# Patient Record
Sex: FEMALE | Race: WHITE
[De-identification: names, ages, dates, MRNs, and addresses within clinical notes are randomized per-mention and may not be internally consistent; named-entity substitution may affect disease eponyms.]

---

## 2017-07-12 ENCOUNTER — HOSPITAL ENCOUNTER (OUTPATIENT)
Dept: HOSPITAL 39 - GMAB | Age: 72
Discharge: HOME | End: 2017-07-12
Attending: FAMILY MEDICINE
Payer: MEDICARE

## 2017-07-12 DIAGNOSIS — I10: Primary | ICD-10-CM

## 2017-12-11 ENCOUNTER — HOSPITAL ENCOUNTER (OUTPATIENT)
Dept: HOSPITAL 39 - GMAB | Age: 72
Discharge: HOME | End: 2017-12-11
Attending: FAMILY MEDICINE
Payer: MEDICARE

## 2017-12-11 DIAGNOSIS — M62.81: ICD-10-CM

## 2017-12-11 DIAGNOSIS — M79.1: Primary | ICD-10-CM

## 2017-12-21 ENCOUNTER — HOSPITAL ENCOUNTER (OUTPATIENT)
Dept: HOSPITAL 39 - US | Age: 72
Discharge: HOME | End: 2017-12-21
Attending: SURGERY
Payer: MEDICARE

## 2017-12-21 DIAGNOSIS — R10.11: Primary | ICD-10-CM

## 2017-12-21 PROCEDURE — 77063 BREAST TOMOSYNTHESIS BI: CPT

## 2017-12-21 PROCEDURE — 76700 US EXAM ABDOM COMPLETE: CPT

## 2018-01-24 ENCOUNTER — HOSPITAL ENCOUNTER (OUTPATIENT)
Dept: HOSPITAL 39 - MRI | Age: 73
End: 2018-01-24
Payer: MEDICARE

## 2018-01-24 DIAGNOSIS — R26.9: ICD-10-CM

## 2018-01-24 DIAGNOSIS — M50.30: Primary | ICD-10-CM

## 2018-01-24 DIAGNOSIS — Z98.1: ICD-10-CM

## 2018-01-24 NOTE — MRI
EXAM DESCRIPTION: 

Brain w/o Contrast: MRI.



CLINICAL HISTORY: 

Unspecified abnormalities of gait and mobility



COMPARISON: 

MRI scan of brain 3/8/2016.



TECHNIQUE: 

Multiplanar, high-field MRI unit, multiple diffusion sequences,

multiple conventional sequences without contrast.



FINDINGS: 

Multifocal regions of bright FLAIR and T2-weighted signal in the

periventricular white matter and gray-white matter junctions of

the cerebral hemispheres. More numerous on the left than the

right and more numerous in the frontal and parietal lobes. .  3

lesions, similar signal, left basal ganglia. One lesion posterior

on the right. No hemorrhage, no cerebral edema, no mass-effect.

Normal signal in the brainstem and cerebellar hemispheres. No

hemorrhage, no cerebral edema, no mass-effect.

 

Concordance of the diffusion and non-diffusion sequences with no

diffusion restriction. Cortical sulci, ventricles, and other CSF

spaces, and the subdural spaces are normally configured for

patients age. No effacement or displacement. No midline shift. No

extra-axial hemorrhage.

 

Normal flow signal void in the major vessels of the [place]

[place], and the venous sinuses. IACs are symmetric bilaterally.

Minimal fluid signal in the inferior right mastoid air cells. No

mass effect in the bilateral cerebellopontine angles.   Fluid

signal inferior to the left semicircular canals. Also soft tissue

lateral to the canals. Pituitary gland occupies most of the

sella. Base of the cerebellar tonsils is above the foramen

magnum. Minimal mucosal periosteal thickening in the ethmoid

paranasal sinuses. Bilateral alex bullosa in the middle

turbinates. The bony calvarium is intact.



IMPRESSION: 

1. Bilateral multifocal identified bright objects in the

periventricular white matter and subcortical white matter. Stable

since the prior study. This likely related to cerebral

microvascular disease. Less likely result of white matter

demyelination, vasculitis, migraine headaches, or inflammatory

process. No mass effect hemorrhage or midline shift.

2. Stable ventricles and extra-axial spaces since the prior

study.

3. Fluid inferior to the semicircular canals and soft tissue

lateral to the canals is of uncertain significance. Correlate for

inner ear disease or inflammatory process. Not seen on the prior

study.



Electronically signed by:  Los Pino MD  1/24/2018 4:02 PM CST

Workstation: [id number]

## 2018-01-24 NOTE — MRI
EXAM DESCRIPTION: 

Cervical Spine: MRI. Noncontrast.



CLINICAL HISTORY: 

Other cervical disc degeneration, unspecified cervical region



COMPARISON: 

MRI scan cervical spine 7/2/2015. MRI scan of the brain on this

visit.



TECHNIQUE: 

Multiplanar MRI, multiple sequences, non-contrast  High-field.

Cervical spine.



FINDINGS: 

C2-3: Disc desiccation with no significant bulging. No canal or

foraminal stenosis. Facets are unremarkable.



C3-4: Mild disc desiccation. Posterior disc bulge. Mild bilateral

foraminal narrowing. Mild canal narrowing. Minimal facet

arthrosis bilaterally.



C4-5: Disc desiccation with left uncinate spur. Left disc

osteophyte complex encroaching on the left neural foramen with

stenosis and 3 mm bulge of the disc abutting the left ventral

cord. Borderline canal stenosis. Mild right neural foraminal

narrowing. Facets are unremarkable.



C5-6: Disc desiccation. Posterior midline and right paracentral

disc bulge 3 mm abutting the cord. Mild right neural foraminal

narrowing and moderate canal narrowing. Bilateral facets are

negative.



C6-7: Minimal canal narrowing. Moderate neural foraminal

narrowing with left uncinate spur. No abnormal fluid collections

around the hardware or abnormal marrow signal in the vertebral

bodies or posterior elements.



C7-T1: Normal signal in the disc and disc space. Canal and

foramina neural foramina are patent. Trace anterolisthesis..

Normal facets.



Normal signal in the T1-T2 disc with no bulging. Disc spaces

preserved. Canal and neural foramina are patent. Facets are

unremarkable.



No cord compression or cord edema. Spine is minimally lordotic..

Atlantoaxial joint is minimally hypertrophic.. Base of the

cerebellar tonsils is slightly above the foramen magnum. 

Paravertebral soft tissues are unremarkable.  Vertebral bodies

are not compressed at any level. Normal marrow signal in the

remaining vertebral bodies and the posterior elements.



IMPRESSION: 

1. C4-5 left side disc osteophyte complex encroachment on the

left neural foramen which is stenotic. Correlate for left C5

radiculopathy. Posterior disc bulge. Borderline canal stenosis.

2. Disc bulges at other levels. Uncinate spurs and/or facet

arthrosis at some levels narrowing the neural foramina.

3. Anterior fusion construct at C6-7 with no evidence of

complications. Moderate canal narrowing. No significant neural

foraminal narrowing.



Electronically signed by:  Los Pino MD  1/24/2018 4:57 PM CST

Workstation: [id number]

## 2019-07-08 ENCOUNTER — HOSPITAL ENCOUNTER (OUTPATIENT)
Dept: HOSPITAL 39 - CT | Age: 74
End: 2019-07-08
Attending: NURSE PRACTITIONER
Payer: MEDICARE

## 2019-07-08 DIAGNOSIS — K62.89: Primary | ICD-10-CM

## 2019-07-08 DIAGNOSIS — N83.202: ICD-10-CM

## 2019-07-08 NOTE — CT
EXAM DESCRIPTION: Abdomen/Pelvis w/Contrast



CLINICAL HISTORY: 73 years Female, OTHER SPECIFIED DISEASES OF

ANUS AND RECTUM



COMPARISON: Ultrasound of the abdomen dated 12/21/2017. CT

abdomen pelvis dated 6/21/2013.



TECHNIQUE: Contiguous 3 mm axial images were obtained from lung

bases to the level of proximal femora after  the administration

of intravenous and oral contrast. Sagittal and coronal

reconstructions were reviewed.



FINDINGS: 



Calcified bilateral breast implants are noted. The imaged lower

thorax appears normal. Septated cystic lesions are identified in

hepatic segments 7 and 2. The gallbladder is mildly distended

with no gross abnormality. The pancreas, spleen, bilateral

adrenal glands appear normal. No hydronephrosis or

nephrolithiasis is identified within both kidneys. Soft tissue

filling defects are identified in the left upper pole renal

calyces and renal pelvis, concerning for malignancy.



The stomach is not well-distended limiting detailed evaluation.

The small bowel loops appear normal. Majority of the colon is not

well-distended limiting detailed evaluation.



The appendix appears normal



The urinary bladder is moderately distended with no gross

abnormality. The uterus is surgically absent. The right adnexa

appears normal. 2.9 cm simple left ovarian cyst is noted.



The abdominal aorta demonstrates moderate to severe

atherosclerotic disease. The inferior vena cava is normal in size

and caliber. Few subcentimeter lymph nodes are identified in the

retroperitoneum.



The visualized bones appear osteopenic. No suspicious lesions are

identified in the visualized bones.



IMPRESSION: 

1. Soft tissue filling defects are identified in the left upper

pole renal calyces and renal pelvis, concerning for malignancy.

Urology consultation is recommended.

2. 2.9 cm simple left ovarian cyst. Ultrasound of the pelvis is

recommended in one year to document stability.





This exam was performed according to our departmental

dose-optimization program, which includes automated exposure

control, adjustment of the mA and/or kV according to patient size

and/or use of iterative reconstruction technique.



Electronically signed by:  Gaurav Michael MD  7/8/2019 4:43

PM CDT Workstation: [id number]

## 2019-08-08 ENCOUNTER — HOSPITAL ENCOUNTER (EMERGENCY)
Dept: HOSPITAL 39 - ER | Age: 74
Discharge: HOME | End: 2019-08-08
Payer: MEDICARE

## 2019-08-08 VITALS — OXYGEN SATURATION: 97 %

## 2019-08-08 VITALS — SYSTOLIC BLOOD PRESSURE: 195 MMHG | DIASTOLIC BLOOD PRESSURE: 99 MMHG

## 2019-08-08 VITALS — TEMPERATURE: 98.9 F

## 2019-08-08 DIAGNOSIS — Z79.899: ICD-10-CM

## 2019-08-08 DIAGNOSIS — N39.0: ICD-10-CM

## 2019-08-08 DIAGNOSIS — I10: ICD-10-CM

## 2019-08-08 DIAGNOSIS — Z98.1: ICD-10-CM

## 2019-08-08 DIAGNOSIS — M48.02: Primary | ICD-10-CM

## 2019-08-08 DIAGNOSIS — Z88.5: ICD-10-CM

## 2019-08-08 DIAGNOSIS — Z79.82: ICD-10-CM

## 2019-08-08 DIAGNOSIS — M79.602: ICD-10-CM

## 2019-08-08 DIAGNOSIS — M54.12: ICD-10-CM

## 2019-08-08 RX ADMIN — NITROFURANTOIN MONOHYDRATE/MACROCRYSTALLINE ONE MG: 25; 75 CAPSULE ORAL at 04:48

## 2019-08-08 RX ADMIN — HYDROCODONE BITARTRATE AND ACETAMINOPHEN ONE: 10; 325 TABLET ORAL at 04:52

## 2019-08-08 RX ADMIN — HYDROCODONE BITARTRATE AND ACETAMINOPHEN ONE EA: 7.5; 325 TABLET ORAL at 04:50

## 2019-08-08 NOTE — RAD
EXAM DESCRIPTION: Single view of the chest



CLINICAL HISTORY: limb/neck pain



COMPARISON: None.



FINDINGS: Single frontal view of the chest. 



Cardiomediastinal silhouette: Atherosclerotic calcification of

the thoracic aorta. Heart is not enlarged. 

Lungs: No consolidation, pneumothorax, or pleural effusion. 

Bones: No acute osseous abnormality. Anterior fixation of the

cervical spine visualized. Bilateral breast implants.

Upper abdomen: No abnormality identified.



IMPRESSION:



1. No acute pulmonary process identified.



Electronically signed by:  Misbah Hitchcock  8/8/2019 3:36 AM

CDT Workstation: [id number]

## 2019-08-08 NOTE — ED.PDOC
History of Present Illness





- General


Chief Complaint: Upper Extremity Injury


Stated Complaint: left shoulder pain, thinks it's a pinched nerve


Time Seen by Provider: 08/08/19 01:59


Source: patient


Exam Limitations: no limitations





- History of Present Illness


Initial Comments: 





Kristi Ott 74 y/o female came to er with sharp pain back of her neck 

radiating down to her fingers for the last 2 1/2 weeks.Denies pain on her chest 

but was told that she has pinched nerve.Had MRI C-Spine last year showed 

stenosis c4-c5 and with posterior left disc bulge. Had seen 2 neurologist Dr. Rider and Dr. Alvarado in [facility] and had nerve conduction studies and 3 office 

visits but no further treatment done.


Occurred: other - 2 1/2 weeks


Pain - Upper Extremity: moderate: Shoulder, left


Method of Injury: other - NO INJURY


Improving Factors: rest


Worsening Factors: movement


Associated Symptoms: pain


Allergies/Adverse Reactions: 


Allergies





Codeine Allergy (Verified 08/08/19 01:38)


   





Home Medications: 


Ambulatory Orders





ALPRAZolam [Xanax] 0.5 mg PO QID 08/08/19 


Aspirin [(None)] 325 mg PO QD 08/08/19 


Losartan Potassium 50 mg PO DAILY 08/08/19 


Metoprolol Succinate [Metoprolol Succinate ER] 100 mg PO BEDTIME 08/08/19 


Nitrofurantoin Monohydrate Mac [Macrobid] 100 mg PO BID 7 Days #14 capsule 

08/08/19 


Omeprazole [Prilosec Cap] 40 mg PO ACBK 08/08/19 


Tramadol HCl 50 mg PO Q4HR PRN 08/08/19 


Zolpidem Tartrate [Ambien] 10 mg PO BEDTIME 08/08/19 











Review of Systems





- Review of Systems


Constitutional: States: no symptoms reported


Respiratory: States: no symptoms reported


Cardiology: States: no symptoms reported


Gastrointestinal/Abdominal: States: no symptoms reported


Genitourinary: States: no symptoms reported


Musculoskeletal: States: see HPI


Neurological: States: see HPI, other - radicular pain


All other Systems: Reviewed and Negative, No Change from Baseline





Past Medical History (General)





- Patient Medical History


Hx Seizures: No


Hx Stroke: No


Hx Dementia: No


Hx Asthma: No


Hx of COPD: No


Hx Cardiac Disorders: No


Hx Congestive Heart Failure: No


Hx Pacemaker: No


Hx Hypertension: Yes


Hx Thyroid Disease: No


Hx Diabetes: No


Hx Gastroesophageal Reflux: No


Hx Renal Disease: No


Hx Cancer: No


Hx of HIV: No


Hx Hepatitis C: No


Hx MRSA: No


Hx Other PMH: Yes - essential tremo


Surgical History: other - cataract;breast ugmentation;carotid EA bilateral;c-

spine fusion





- Vaccination History


Hx Tetanus, Diphtheria Vaccination: No


Hx Influenza Vaccination: No


Hx Pneumococcal Vaccination: Yes





- Social History


Hx Tobacco Use: Yes


Hx Alcohol Use: Yes


Hx Depression: No


Feels Threatened In Home Enviroment: No


Hx Physical Abuse: No


Hx Emotional Abuse: No





- Activities of Daily Living


[facility]


Grooming Ability: Independent


Eating (Feeding) Ability: Independent


Toileting Ability: Independent





Family Medical History





- Family History


  ** Mother


Family History: Unknown


Hx Family Asthma: Yes - copd-mom





Physical Exam





- Physical Exam


General Appearance: Alert, Comfortable, No apparent distress


Eyes, Ears, Nose, Throat Exam: normal ENT inspection, pharynx normal


Neck: supple, normal inspection, limited range of motion - pain, other - shaking

 neck


Cardiovascular/Respiratory: regular rate, rhythm, no M/R/G, normal peripheral 

pulses, no JVD, normal breath sounds


Abdominal Exam: non-tender, no organomegaly


Back Exam: no CVA tenderness, no vertebral tenderness


Elbow/Forearm Exam: normal inspection, non-tender, no evidence of injury, normal

 ROM


Wrist Exam: normal inspection, non-tender, no evidence of injury, normal ROM


Hand Exam: normal inspection, non-tender, no evidence of injury, normal ROM


DTR: 2+: Brachioradialis, left, Brachioradialis, right


Neuro/Tendon: normal sensation, normal motor functions, normal tendon functions


Mental Status: alert, oriented x 3


Skin Exam: normal color, warm/dry





Progress





- Progress


Progress: 





08/08/19 02:40


                               Vital Signs - 24 hr











  08/08/19 08/08/19





  01:27 02:38


 


Temperature 98.9 F 


 


Pulse Rate [ 79 72





monitor]  


 


Respiratory 16 18





Rate  


 


Blood Pressure 158/84 161/91





[Right Arm]  


 


O2 Sat by Pulse 98 97





Oximetry  














- Results/Orders


Results/Orders: 





                                        





08/08/19 02:00


IV Care:Saline Lock per Protoc QSHIFT 


EKG Assessment DAILY 


EKG STAT 


Chest,1 View [RAD] Stat 





08/08/19 02:05


Urine Culture Stat 








                         Laboratory Results - last 24 hr











  08/08/19 08/08/19





  02:05 02:20


 


WBC   9.9


 


RBC   4.15 L


 


Hgb   13.5


 


Hct   38.8


 


MCV   93.6


 


MCH   32.7 H


 


MCHC   34.9


 


RDW   14.1


 


Plt Count   461 H


 


MPV   6.6 L


 


Absolute Neuts (auto)   4.80


 


Absolute Lymphs (auto)   3.60 H


 


Absolute Monos (auto)   1.10 H


 


Absolute Eos (auto)   0.40


 


Absolute Basos (auto)   0.10


 


Neutrophils %   48.3


 


Lymphocytes %   35.9


 


Monocytes %   10.8 H


 


Eosinophils %   3.6


 


Basophils %   1.4


 


PT   10.4


 


INR   1.04


 


PTT (SP)   25.4


 


Sodium   131 L


 


Potassium   3.9


 


Chloride   94 L


 


Carbon Dioxide   25


 


Anion Gap   15.9


 


BUN   13


 


Creatinine   1.26


 


BUN/Creatinine Ratio   10.3


 


Random Glucose   97


 


Serum Osmolality   262.7 L


 


Calcium   9.8


 


Magnesium   1.8


 


Total Bilirubin   0.5


 


Direct Bilirubin   < 0.1


 


Indirect Bilirubin   0.4


 


AST   18


 


ALT   13


 


Alkaline Phosphatase   41 L


 


Creatine Kinase   115


 


CK-MB (CK-2)   6.6 H*


 


CK-MB (CK-2) %   Not Reportable


 


Troponin I   < 0.02


 


Serum Total Protein   7.5


 


Albumin   4.3


 


Urine Color  Yellow 


 


Urine Appearance  Cloudy 


 


Urine pH  5.5 


 


Ur Specific Gravity  1.020 


 


Urine Protein  Negative 


 


Urine Glucose (UA)  Negative 


 


Urine Ketones  Negative 


 


Urine Blood  Moderate H 


 


Urine Nitrite  Negative 


 


Urine Bilirubin  Negative 


 


Urine Urobilinogen  0.2 


 


Ur Leukocyte Esterase  Trace H 


 


Urine RBC  5-10 H 


 


Urine WBC  10-20 H 


 


Ur Epithelial Cells  10-20 


 


Urine Bacteria  3+ H 


 


Urine Yeast  1+ budding H 








Discuss all test result with patient





- EKG/XRAY/CT


EKG: Sinus, no ST T wave changes


Comments: HR-73





Departure





- Departure


Clinical Impression: 


 Cervical radiculopathy due to degenerative joint disease of spine, Spinal 

stenosis in cervical region





Limb pain


Qualifiers:


 Extremity pain location: upper extremity Laterality: left Qualified Code(s): M

79.602 - Pain in left arm





Urinary tract infection


Qualifiers:


 Urinary tract infection type: site unspecified Hematuria presence: with 

hematuria Qualified Code(s): N39.0 - Urinary tract infection, site not 

specified; R31.9 - Hematuria, unspecified





Time of Disposition: 04:47


Disposition: Discharge to Home or Self Care


Departure Forms:  ED Discharge - Pt. Copy, Patient Portal Self Enrollment


Instructions:  Spinal Stenosis, Spinal Stenosis (DC), Spinal Stenosis Stretching

 Exercises, Radiculopathy, Radiculopathy (DC)


Referrals: 


CAROL CLARK IV, NP [Primary Care Provider] - 1-2 Weeks


Prescriptions: 


Nitrofurantoin Monohydrate Mac [Macrobid] 100 mg PO BID 7 Days #14 capsule


Home Medications: 


Ambulatory Orders





ALPRAZolam [Xanax] 0.5 mg PO QID 08/08/19 


Aspirin [(None)] 325 mg PO QD 08/08/19 


Losartan Potassium 50 mg PO DAILY 08/08/19 


Metoprolol Succinate [Metoprolol Succinate ER] 100 mg PO BEDTIME 08/08/19 


Nitrofurantoin Monohydrate Mac [Macrobid] 100 mg PO BID 7 Days #14 capsule 

08/08/19 


Omeprazole [Prilosec Cap] 40 mg PO ACBK 08/08/19 


Tramadol HCl 50 mg PO Q4HR PRN 08/08/19 


Zolpidem Tartrate [Ambien] 10 mg PO BEDTIME 08/08/19 








Additional Instructions: 


Follow up with primary Md Dr. LISETH Clark 09 August 2019 for neurologist,spine 

specialist referral;Return to emergency room as needed

## 2019-08-16 ENCOUNTER — HOSPITAL ENCOUNTER (OUTPATIENT)
Dept: HOSPITAL 39 - MRI | Age: 74
End: 2019-08-16
Attending: NURSE PRACTITIONER
Payer: MEDICARE

## 2019-08-16 DIAGNOSIS — M50.10: Primary | ICD-10-CM

## 2019-08-16 DIAGNOSIS — Z98.1: ICD-10-CM

## 2019-08-16 DIAGNOSIS — M48.02: ICD-10-CM

## 2019-08-16 DIAGNOSIS — R22.1: ICD-10-CM

## 2019-08-16 NOTE — MRI
EXAM DESCRIPTION: 

Cervical Spine: MRI.



CLINICAL HISTORY: 

73 years Female RADICULOPATHY



COMPARISON: 

MRI scan of the cervical spine without contrast 1/24/2018.



TECHNIQUE: 

Multiplanar, high-field MRI, multiple sequences, non-contrast 

Cervical spine.



FINDINGS: 

C4-C5: Minimal disc desiccation and minimal disc space loss.

Posterior midline bulge of the disc. Left uncinate spur. Minimal

arthrosis of the left facet. Moderate neural foraminal narrowing.

Mild canal narrowing. Stable since the prior study.



C5-C6: Disc desiccation and disc space loss. Posterior disc

osteophyte bulging abutting the ventral cord. Posterior ligament

thickening. Borderline mild canal stenosis. Mild left facet

hypertrophic arthrosis. Mild to moderate left neural foraminal

narrowing and patent right neural foramina. No change since the

prior study.



ACDF C6-C7: Minimal artifact from the hardware. Stable position.

Minimal narrowing of the canal. Bilateral foramina patent. No

fluid in the canal, posterior soft tissues, or around the facet

joints. Normal cord signal. Stable since the prior study.



Oval-shaped hyperintense T1 and T2 signal anterior and to the

left of the hardware. In the fascia or the left side esophagus or

parathyroid region. At the level of the superior thyroid. The

lesion has mixed hyperintense and hypointense inversion recovery

signal. Well-defined walls, measuring 1.1 x 1.0 x 0.7 cm. Not

seen on the prior study.



C7-T1 disc and disc space negative. Canal and foramina are

unremarkable. Bilateral mild facet arthrosis left more than

right. No change from the prior study.



Normal signal in the remaining discs with no bulging. Disc spaces

preserved. Canal and neural foramina are patent. Facet joints are

unremarkable.



Spinal alignment reduced lordosis..  No cord compression or cord

edema.  Atlantoaxial joint mild arthrosis.. Base of the

cerebellar tonsils is at the level of the foramen magnum. 

Paravertebral soft tissues negative. Vertebral bodies are not

compressed at any level. Normal marrow signal in the remaining

vertebral bodies and the posterior elements.



IMPRESSION: 

1. C4-C5 disc desiccation and disc space loss. Posterior midline

disc bulge. Arthrosis of the left facet and left uncinate spur

with moderate neural foraminal narrowing. Stable since the prior

study.

2. Borderline mild central canal stenosis multifactorial at

C5-C6. Mild/moderate left neural foraminal narrowing with left

facet hypertrophic arthrosis. No change since the prior study.

3. Fusion construct anteriorly at C6-C7 is stable with no

complications.

4. 1.1 cm nodule in the fascia anterior to the fusion, or in the

left cervical esophagus wall or lumen, or possibly thyroid or

parathyroid tissue. Consider ultrasound and neck soft tissue

follow-up imaging.



Electronically signed by:  Los Pino MD  8/16/2019 3:41 PM CDT

Workstation: [id number]

## 2019-09-09 ENCOUNTER — HOSPITAL ENCOUNTER (EMERGENCY)
Dept: HOSPITAL 39 - ER | Age: 74
Discharge: HOME | End: 2019-09-09
Payer: MEDICARE

## 2019-09-09 VITALS — OXYGEN SATURATION: 91 % | DIASTOLIC BLOOD PRESSURE: 90 MMHG | SYSTOLIC BLOOD PRESSURE: 104 MMHG

## 2019-09-09 VITALS — TEMPERATURE: 98 F

## 2019-09-09 DIAGNOSIS — Z79.82: ICD-10-CM

## 2019-09-09 DIAGNOSIS — Z87.891: ICD-10-CM

## 2019-09-09 DIAGNOSIS — G58.9: Primary | ICD-10-CM

## 2019-09-09 DIAGNOSIS — I10: ICD-10-CM

## 2019-09-09 DIAGNOSIS — Z79.899: ICD-10-CM

## 2019-09-09 DIAGNOSIS — Z88.5: ICD-10-CM

## 2019-09-09 NOTE — ED.PDOC
History of Present Illness





- General


Chief Complaint: General


Stated Complaint: felt left arm pain/numbness


Time Seen by Provider: 09/09/19 02:26





- History of Present Illness


Initial Comments: 





c/o having numbness , weakness and pain in the LUE since 3 weeks , thought that 

she might be getting stroke so came to er for evaluation . 


Allergies/Adverse Reactions: 


Allergies





Codeine Allergy (Verified 08/08/19 01:38)


   





Home Medications: 


Ambulatory Orders





ALPRAZolam [Xanax] 0.5 mg PO QID 08/08/19 


Aspirin [(None)] 325 mg PO QD 08/08/19 


Losartan Potassium 50 mg PO DAILY 08/08/19 


Metoprolol Succinate [Metoprolol Succinate ER] 100 mg PO BEDTIME 08/08/19 


Nitrofurantoin Monohydrate Mac [Macrobid] 100 mg PO BID 7 Days #14 capsule 

08/08/19 


Omeprazole [Prilosec Cap] 40 mg PO ACBK 08/08/19 


Tramadol HCl 50 mg PO Q4HR PRN 08/08/19 


Zolpidem Tartrate [Ambien] 10 mg PO BEDTIME 08/08/19 











Review of Systems





- Review of Systems


Constitutional: States: no symptoms reported


EENTM: States: no symptoms reported


Respiratory: States: no symptoms reported


Cardiology: States: no symptoms reported


Gastrointestinal/Abdominal: States: no symptoms reported


Genitourinary: States: no symptoms reported


Musculoskeletal: States: see HPI


Skin: States: no symptoms reported


Neurological: States: see HPI





Past Medical History (General)





- Patient Medical History


Hx Seizures: No


Hx Stroke: No


Hx Dementia: No


Hx Asthma: No


Hx of COPD: No


Hx Cardiac Disorders: No


Hx Congestive Heart Failure: No


Hx Pacemaker: No


Hx Hypertension: Yes


Hx Thyroid Disease: No


Hx Diabetes: No


Hx Gastroesophageal Reflux: No


Hx Renal Disease: No


Hx Cancer: No


Hx of HIV: No


Hx Hepatitis C: No


Hx MRSA: No


Surgical History: Hysterectomy





- Vaccination History


Hx Tetanus, Diphtheria Vaccination: No


Hx Influenza Vaccination: No


Hx Pneumococcal Vaccination: Yes


Immunizations Up to Date: Yes





- Social History


Hx Tobacco Use: Yes


Hx Alcohol Use: Yes - occasional


Hx Depression: No


Hx Physical Abuse: No


Hx Emotional Abuse: No





- Female History


Patient is a Female of Child Bearing Age (10 -59 yrs old): No





- Triage Comment


ED Triage Comment: Pt told EMS took 3 xanax and ambien tonight , but denies 

ambien, and states took 2 xanax





Family Medical History





- Family History


  ** Mother


Family History: Unknown


Hx Family Asthma: Yes - copd-mom





Physical Exam





- Physical Exam


General Appearance: Alert, Comfortable, Well Developed, Well Groomed, Well 

Hydrated, Well Nourished


Eye Exam: bilateral normal


Ears, Nose, Throat: hearing grossly normal


Neck: non-tender, full range of motion, supple


Respiratory: chest non-tender, lungs clear, normal breath sounds, no respiratory

 distress, no accessory muscle use


Cardiovascular/Chest: normal peripheral pulses, regular rate, rhythm, no edema


Gastrointestinal/Abdominal: non tender, soft, no organomegaly


Back Exam: normal inspection, no CVA tenderness, no vertebral tenderness


Extremity: non-tender


Neurologic: alert, normal mood/affect, oriented x 3, motor weakness - in LUE 


Skin Exam: normal color, warm/dry


Lymphatic: no adenopathy





Progress





- Progress


Progress: 





09/09/19 03:44


                         Laboratory Results - last 24 hr











  09/09/19 09/09/19





  02:26 02:26


 


WBC  8.9 


 


RBC  3.86 L 


 


Hgb  12.6 


 


Hct  36.9 


 


MCV  95.7 


 


MCH  32.6 H 


 


MCHC  34.1 


 


RDW  14.0 


 


Plt Count  356 


 


MPV  7.3 L 


 


Absolute Neuts (auto)  8.60 H 


 


Absolute Lymphs (auto)  0.10 L 


 


Absolute Monos (auto)  0.10 L 


 


Absolute Eos (auto)  0.10 


 


Absolute Basos (auto)  0.00 


 


Neutrophils %  95.9 H 


 


Lymphocytes %  1.5 L 


 


Monocytes %  0.8 L 


 


Eosinophils %  1.6 


 


Basophils %  0.2 


 


Sodium   134 L


 


Potassium   3.4 L


 


Chloride   101


 


Carbon Dioxide   22


 


Anion Gap   14.4


 


BUN   32 H


 


Creatinine   1.89 H


 


BUN/Creatinine Ratio   16.9


 


Random Glucose   111 H


 


Serum Osmolality   275.8


 


Calcium   9.1


 


Total Bilirubin   0.9


 


AST   96 H


 


ALT   101 H


 


Alkaline Phosphatase   113


 


Serum Total Protein   6.0 L


 


Albumin   3.4


 


Globulin   2.6


 


Albumin/Globulin Ratio   1.3














- EKG/XRAY/CT


EKG: nonspecific ST T wave Chg


CT Ordered: Yes





Departure





- Departure


Clinical Impression: 


 Nerve compression





Time of Disposition: 03:46


Disposition: Discharge to Home or Self Care


Condition: Good


Departure Forms:  ED Discharge - Pt. Copy, Patient Portal Self Enrollment


Diet: resume usual diet


Activity: increase activity as tolerated


Referrals: 


CAROL BROCK IV, NP [Primary Care Provider] - 1-2 Weeks


Home Medications: 


Ambulatory Orders





ALPRAZolam [Xanax] 0.5 mg PO QID 08/08/19 


Aspirin [(None)] 325 mg PO QD 08/08/19 


Losartan Potassium 50 mg PO DAILY 08/08/19 


Metoprolol Succinate [Metoprolol Succinate ER] 100 mg PO BEDTIME 08/08/19 


Nitrofurantoin Monohydrate Mac [Macrobid] 100 mg PO BID 7 Days #14 capsule 

08/08/19 


Omeprazole [Prilosec Cap] 40 mg PO ACBK 08/08/19 


Tramadol HCl 50 mg PO Q4HR PRN 08/08/19 


Zolpidem Tartrate [Ambien] 10 mg PO BEDTIME 08/08/19

## 2019-09-09 NOTE — CT
CT head without contrast on 9/9/2019 



CLINICAL INDICATION: Extremity weakness



TECHNIQUE: Multiple axial images are obtained throughout the head

without the administration of contrast. This exam was performed

according to our departmental dose-optimization program, which

includes automated exposure control, adjustment of the mA and/or

kV according to patient size and/or use of iterative

reconstruction technique. 

Total DLP is 859.97 mGy*cm.



COMPARISON: MRI from 3/8/2016



FINDINGS: There is low-density in the periventricular white

matter likely representing mild chronic small vessel ischemic

changes. There is no hydrocephalus. There is no CT evidence of

acute infarct. There is no hemorrhage. There are no abnormal

extra-axial fluid collections. There is no mass, mass effect or

midline shift. No bony abnormality is noted. Air-fluid level is

noted in the left sphenoid sinus consistent with mild sinusitis.



IMPRESSION:

1. No acute intracranial abnormality.

2. Mild sphenoid sinusitis.



Electronically signed by:  Yash Meng  9/9/2019 3:21 AM CDT

Workstation: [id number]

## 2019-09-09 NOTE — RAD
Chest single view on  9/9/2019 



CLINICAL INDICATION: Shortness of breath



COMPARISON: 8/8/2019



FINDINGS: Partially calcified bilateral breast implants are

noted. Vascular calcification is noted in the aorta. The lungs

are clear. Cardiac, hilar and mediastinal contours are within

normal limits. Pulmonary vascularity is within normal limits.

Fusion hardware is noted in the lower cervical spine.



IMPRESSION: No significant change and no acute disease.



Electronically signed by:  Yash Meng  9/9/2019 3:18 AM CDT

Workstation: [id number]

## 2019-09-26 ENCOUNTER — HOSPITAL ENCOUNTER (OUTPATIENT)
Dept: HOSPITAL 39 - BFHH | Age: 74
End: 2019-09-26
Attending: FAMILY MEDICINE
Payer: MEDICARE

## 2019-09-26 DIAGNOSIS — G72.89: ICD-10-CM

## 2019-09-26 DIAGNOSIS — K82.9: Primary | ICD-10-CM

## 2019-10-09 ENCOUNTER — HOSPITAL ENCOUNTER (OUTPATIENT)
Dept: HOSPITAL 39 - GMAM | Age: 74
End: 2019-10-09
Attending: FAMILY MEDICINE
Payer: MEDICARE

## 2019-10-09 DIAGNOSIS — R29.898: Primary | ICD-10-CM

## 2019-10-15 ENCOUNTER — HOSPITAL ENCOUNTER (OUTPATIENT)
Dept: HOSPITAL 39 - MRI | Age: 74
End: 2019-10-15
Attending: FAMILY MEDICINE
Payer: MEDICARE

## 2019-10-15 DIAGNOSIS — I77.89: Primary | ICD-10-CM

## 2019-10-15 DIAGNOSIS — J01.90: ICD-10-CM

## 2019-10-15 DIAGNOSIS — R90.82: ICD-10-CM

## 2019-10-16 ENCOUNTER — HOSPITAL ENCOUNTER (OUTPATIENT)
Dept: HOSPITAL 39 - MRI | Age: 74
End: 2019-10-16
Attending: FAMILY MEDICINE
Payer: MEDICARE

## 2019-10-16 DIAGNOSIS — R29.898: ICD-10-CM

## 2019-10-16 DIAGNOSIS — R51: ICD-10-CM

## 2019-10-16 DIAGNOSIS — M51.36: Primary | ICD-10-CM

## 2019-10-16 DIAGNOSIS — M12.88: ICD-10-CM

## 2019-10-16 NOTE — MRI
EXAM DESCRIPTION: 

Lumbar Spine w/o Contrast : Magnetic Resonance Imaging.



CLINICAL HISTORY: 

LOW BACK PAIN



COMPARISON: 

LUMBAR



TECHNIQUE: 

Multiplanar, multiple standard sequences, non contrast MRI,

lumbar spine.



FINDINGS: 

L5-S1: The disc is well visualized on axial T2 series 501, image

3. Minimal disc desiccation and minimal disc space loss. Small

Schmorl's node superior L1 endplate. Hypertrophic facet arthrosis

bilaterally. Mild bilateral foraminal narrowing. AP canal

diameter 10 mm.



L4-L5: Disc desiccation with disc space preserved and no bulging.

Moderate bilateral hypertrophic facet arthrosis and thickening of

the flavum ligaments with AP canal diameter 9.5 mm. Mild

bilateral foraminal narrowing.



L3-L4: Disc desiccation and minimal disc space loss with no

significant bulging. Moderate bilateral hypertrophic facet

arthrosis and ligament thickening. AP canal diameter 12 mm.



L2-L3: Disc desiccation with disc space preserved and no bulging.

Mild hypertrophic facet arthrosis and ligament thickening. Canal

and foramina are patent. Similar findings at L1-L2.



T12-L1: Disc desiccation with anterior bulging and anterior

endplate ridging. No posterior bulging. No canal or foraminal

stenosis. Posterior elements unremarkable. Conus terminates at

this level. Circumscribed hyperintense T1 and T2 hemangioma

anterior L1 body inferior to the superior endplate. Hemangioma

also at posterior L5 vertebral body underlying the superior

endplate and right side inferior L3 vertebral body and right L3

pedicle. Perineural cyst in the left T11-T12 neural foramen.



Anatomic curvature of the spine.  Paravertebral soft tissues

negative.. Distal cord normal signal and caliber.  Normal marrow

signal in the remaining vertebral bodies and the posterior

elements. Vertebral bodies are not compressed at any level.



IMPRESSION: 

1. Multiple desiccated but no herniated discs. Foramina are

nearly all patent with no stenosis. Multiple levels of

hypertrophic facet arthrosis and posterior flavum ligament

thickening.

2. Borderline mild central canal stenosis L5-S1 and L4-L5.



CRITICAL COMMUNICATION:  

The critical value was discussed directly by phone by Dr. Pino, with Dr. Corina Blake at approximately 1415 hours, on

October 16, 2019.



Electronically signed by:  Los Pino MD  10/16/2019 2:37 PM

CDT Workstation: [id number]

## 2019-10-16 NOTE — MRI
EXAM DESCRIPTION: 

Brain w/o Contrast: MRI.



CLINICAL HISTORY:  

HEADACHE. Low back pain.



COMPARISON: 

MRI lumbar spine without contrast 16 October 2019.



TECHNIQUE: 

Multiplanar, high-field MRI unit, multiple diffusion sequences,

multiple conventional sequences without contrast.



FINDINGS: 

Normal hyperintense foci on the diffusion OB1 thousand image in

the vertex of the right frontoparietal and occipital lobe white

matter, subcortical white matter, and gray matter and subcortical

white matter of the right occipital lobe above the ventricles.

Also similar signal in the cortical gray matter of the occipital

lobe at the level of the occipital horn. These lesions correspond

to hyperintense FLAIR and T2-weighted signal lesions not seen on

the prior study. This is predominantly the right middle cerebral

artery distribution. No diffusion restriction in the left

hemisphere or in the cerebellum brainstem or midbrain. Small

gyriform hyperintense T1 cortical lesions in the posterior right

frontal lobe near the vertex and same hyperintense T1 gyriform

signal in the cortical gray matter right occipital region at the

level of the occipital horn. No other possible intra-axial

hemorrhage



Stable focal hyperintense FLAIR and T2-weighted signal in the

periventricular corona radiata of the left frontal lobe and

subcortical right frontal lobe. Also bilateral smaller foci of

signal in the centrum semiovale frontal and parietal lobes and

left parietal subcortical white matter . No intra-axial

hemorrhage, no cerebral edema, no mass-effect.  Similar focal

abnormal hyperintense signal in the posterior right and anterior

left basal ganglia. Unchanged from the prior study. No diffusion

restriction. Normal signal in the brainstem and cerebellar

hemispheres. No hemorrhage, no parenchymal edema, no mass-effect.

No diffusion restriction.

 

Cortical sulci, ventricles, and other CSF spaces, and the

subdural spaces are normally configured for patients age. No

effacement or displacement. No midline shift. No extra-axial

hemorrhage. Minimal edema in the distal optic nerve sheaths

abutting the optic globes.

 

Normal flow signal void in the major vessels of the [place]

[place], and the venous sinuses. IACs are symmetric bilaterally.

Minimal fluid signal in the left mastoid air cells. No mass

effect in the bilateral cerebellopontine angles.   Pituitary

gland occupies most of the sella. Base of the cerebellar tonsils

is at the level of the foramen magnum. Air-fluid level versus

polyp in the left maxillary antrum and air-fluid level left

sphenoid air cell. The bony calvarium is intact.



IMPRESSION: 

1. Most likely embolic CVA in the right middle cerebral artery

distribution involving multiple sites of the posterior right

frontal lobe, right parietal lobe, and anterior right occipital

lobe, predominantly subcortical white matter or cortical gray

matter. Hyperintense T1 gyriform cortical signal at the anterior

watershed in the posterior watershed could represent

pseudolaminar necrosis or residual hemorrhage. No mass effect.

2. Stable white matter lesions most likely related to cerebral

microvascular disease/aging in the left frontal lobe, subcortical

right frontal lobe, centrum semiovale, and bilateral basal

ganglia. No diffusion restriction hemorrhage or mass effect.

3. Progressive paranasal sinusitis predominantly left of midline

since the prior study with probable acute disease the sphenoid

air cell. 



Electronically signed by:  Los Pino MD  10/16/2019 12:43 PM

CDT Workstation: [id number]

## 2019-10-17 ENCOUNTER — HOSPITAL ENCOUNTER (OUTPATIENT)
Dept: HOSPITAL 39 - LAB.O | Age: 74
End: 2019-10-17
Attending: FAMILY MEDICINE
Payer: MEDICARE

## 2019-10-17 DIAGNOSIS — I63.00: ICD-10-CM

## 2019-10-17 DIAGNOSIS — R91.8: Primary | ICD-10-CM

## 2019-10-17 DIAGNOSIS — Z86.718: ICD-10-CM

## 2019-10-18 NOTE — CT
EXAM DESCRIPTION: Chest w/o Contrast



CLINICAL HISTORY: 74 years Female, ABNORMAL FINDING LUNG FIELD



COMPARISON: Chest radiograph 9/9/2019. CT chest 1/15/2016.



TECHNIQUE: CT images through the chest without IV contrast.

Multiplanar reformations provided.  This exam was performed

according to our departmental dose-optimization program, which

includes automated exposure control, adjustment of the mA and/or

kV according to patient size and/or use of iterative

reconstruction technique.



CT CHEST FINDINGS:

Heart and mediastinum: Normal size heart. No pericardial

effusion. Mild aortic atherosclerosis. Moderate coronary

calcification. Unremarkable esophagus. No mediastinal adenopathy.

Evaluation for hilar adenopathy limited without intravenous

contrast. However, there is fullness within the left hilum

(series 2, image 27). This is new compared to 1/15/2016.



Thyroid Gland: Normal.



Lungs: Mild emphysematous changes. Linear fibrosis or atelectasis

in the right middle lobe and left lower lobe.



Airways: Minimal bronchiectasis in the lower lobes. No bronchial

wall thickening or airway filling defects.



Pleura: Normal.



Musculoskeletal and Soft Tissues: No acute fracture or aggressive

appearing osseous lesion.  Partially peripherally calcified

breast implants in place bilaterally.



Subphrenic Structures: Fluid attenuating lesion measuring up to

2.3 cm in the lateral left hepatic lobe.



IMPRESSION:

1. Fullness about the left hilum may represent left hilar mass

versus adenopathy of unknown origin. This is new when compared to

CT chest 1/15/2016.

2. Mild emphysematous changes.

3. Fluid attenuating lesion in the left hepatic lobe is

redemonstrated from 1/15/2016 and may represent a cyst or

hemangioma.



Electronically signed by:  Ezequiel Marin MD  10/18/2019 8:08 AM CDT

Workstation: [id number]

## 2019-11-13 ENCOUNTER — HOSPITAL ENCOUNTER (OUTPATIENT)
Dept: HOSPITAL 39 - GMAM | Age: 74
End: 2019-11-13
Attending: FAMILY MEDICINE
Payer: MEDICARE

## 2019-11-13 DIAGNOSIS — R76.0: Primary | ICD-10-CM

## 2019-12-18 ENCOUNTER — HOSPITAL ENCOUNTER (OUTPATIENT)
Dept: HOSPITAL 39 - BFHH | Age: 74
End: 2019-12-18
Attending: INTERNAL MEDICINE
Payer: MEDICARE

## 2019-12-18 DIAGNOSIS — G62.9: Primary | ICD-10-CM

## 2019-12-18 DIAGNOSIS — I10: ICD-10-CM

## 2019-12-18 DIAGNOSIS — C34.92: ICD-10-CM

## 2019-12-24 ENCOUNTER — HOSPITAL ENCOUNTER (OUTPATIENT)
Dept: HOSPITAL 39 - BFHH | Age: 74
End: 2019-12-24
Attending: INTERNAL MEDICINE
Payer: MEDICARE

## 2019-12-24 DIAGNOSIS — G62.9: ICD-10-CM

## 2019-12-24 DIAGNOSIS — C34.92: Primary | ICD-10-CM

## 2019-12-24 DIAGNOSIS — I10: ICD-10-CM

## 2020-01-08 ENCOUNTER — HOSPITAL ENCOUNTER (OUTPATIENT)
Dept: HOSPITAL 39 - BFHH | Age: 75
End: 2020-01-08
Attending: INTERNAL MEDICINE
Payer: MEDICARE

## 2020-01-08 DIAGNOSIS — C34.92: Primary | ICD-10-CM

## 2020-01-08 DIAGNOSIS — I10: ICD-10-CM

## 2020-01-17 ENCOUNTER — HOSPITAL ENCOUNTER (OUTPATIENT)
Dept: HOSPITAL 39 - BFHH | Age: 75
End: 2020-01-17
Attending: FAMILY MEDICINE
Payer: MEDICARE

## 2020-01-17 DIAGNOSIS — I10: ICD-10-CM

## 2020-01-17 DIAGNOSIS — C34.92: Primary | ICD-10-CM

## 2020-12-14 ENCOUNTER — HOSPITAL ENCOUNTER (OUTPATIENT)
Dept: HOSPITAL 39 - INFRM | Age: 75
End: 2020-12-14
Attending: INTERNAL MEDICINE
Payer: MEDICARE

## 2020-12-14 DIAGNOSIS — D50.8: Primary | ICD-10-CM

## 2020-12-14 PROCEDURE — 96365 THER/PROPH/DIAG IV INF INIT: CPT

## 2020-12-14 RX ADMIN — IRON SUCROSE SCH MLS/HR: 20 INJECTION, SOLUTION INTRAVENOUS at 13:31

## 2020-12-21 RX ADMIN — IRON SUCROSE SCH MLS/HR: 20 INJECTION, SOLUTION INTRAVENOUS at 13:29

## 2021-02-12 ENCOUNTER — HOSPITAL ENCOUNTER (OUTPATIENT)
Dept: HOSPITAL 39 - MS | Age: 76
Setting detail: OBSERVATION
LOS: 1 days | Discharge: HOME HEALTH | End: 2021-02-13
Attending: NURSE PRACTITIONER | Admitting: FAMILY MEDICINE
Payer: MEDICARE

## 2021-02-12 DIAGNOSIS — Z86.73: ICD-10-CM

## 2021-02-12 DIAGNOSIS — I13.0: ICD-10-CM

## 2021-02-12 DIAGNOSIS — N17.9: ICD-10-CM

## 2021-02-12 DIAGNOSIS — C64.1: ICD-10-CM

## 2021-02-12 DIAGNOSIS — I73.9: ICD-10-CM

## 2021-02-12 DIAGNOSIS — Z79.899: ICD-10-CM

## 2021-02-12 DIAGNOSIS — J12.82: ICD-10-CM

## 2021-02-12 DIAGNOSIS — M48.02: ICD-10-CM

## 2021-02-12 DIAGNOSIS — N18.30: ICD-10-CM

## 2021-02-12 DIAGNOSIS — I50.30: ICD-10-CM

## 2021-02-12 DIAGNOSIS — U07.1: Primary | ICD-10-CM

## 2021-02-12 DIAGNOSIS — F17.210: ICD-10-CM

## 2021-02-12 DIAGNOSIS — C34.90: ICD-10-CM

## 2021-02-12 DIAGNOSIS — C64.2: ICD-10-CM

## 2021-02-12 DIAGNOSIS — E78.2: ICD-10-CM

## 2021-02-12 DIAGNOSIS — R11.2: ICD-10-CM

## 2021-02-12 RX ADMIN — GUAIFENESIN SCH MG: 600 TABLET, EXTENDED RELEASE ORAL at 20:52

## 2021-02-12 RX ADMIN — ALBUTEROL SULFATE SCH PUFF: 90 AEROSOL, METERED RESPIRATORY (INHALATION) at 20:41

## 2021-02-12 RX ADMIN — ENOXAPARIN SODIUM SCH MG: 80 INJECTION, SOLUTION INTRAVENOUS; SUBCUTANEOUS at 18:58

## 2021-02-12 RX ADMIN — Medication SCH ML: at 20:52

## 2021-02-12 NOTE — RAD
EXAM:  XR Chest, 1 View



CLINICAL HISTORY:  The patient is 75 years old and is Female;

COVID PNA



TECHNIQUE:  Single view of the chest.



COMPARISON: September 9, 2019.



FINDINGS:

  Lungs:  Hazy bilateral pulmonary opacities, more pronounced in

the lung bases.

      No pulmonary vascular congestion.

  Pleural space:  No pleural effusion or pneumothorax.

  Heart:  Unremarkable.  No cardiomegaly.

  Mediastinum:  Unremarkable.

  Bones/joints:  Cervicothoracic spinal hardware. No acute

fracture visualized.

  Upper abdomen:  No free air in the visualized upper abdomen.



IMPRESSION:     

  Hazy bilateral pulmonary opacities, more pronounced in the lung

bases. 



Electronically signed by:  Abida De La O MD  2/12/2021 5:39

PM [facility] Workstation: [id number]

## 2021-02-12 NOTE — HP
SUPERVISING PHYSICIAN:  SHERLYN DELGADO MD



CHIEF COMPLAINT: Nausea, shortness of breath.



HISTORY OF PRESENT ILLNESS:   Ms. Ott is a 75 year-old  female 
patient who has a history of lung and kidney cancer.  She was diagnosed with 
Covid on 2/11/21 and was given the monoclonal infusion this morning.  She went 
home after the infusion, started having some nausea, at that point she called 
Dr. Blake who recommended she come to the Emergency Room for direct evaluation.
 She was evaluated in the Emergency Room.  Her labs were reviewed from the 
clinic which showed she had a creatinine of 2.48 was explained to BUN of 59.  
Differential did not show a left shift with a white count of 5,700.  

Her D-dimer was elevated at 2230 and C-reactive protein was elevated at 5.1.  On
admission, she was showing a temperature of 101.3, oxygen saturation 92% on 2 
liter nasal cannula.  Single view chest x-ray on admission showed some hazy 
bilateral pulmonary opacities, more prominent in the lung bases. Given the 
patient's advanced of 75 along with 2 separate types of cancer including lung 
and kidney and recent Covid diagnosis, the patient is going to be placed in 
observation at least overnight for further evaluation and close monitoring.  She
is placed in observation in stable condition.



PAST MEDICAL HISTORY: 

1.   Hypertension with diastolic dysfunction, grade 1, with last ejection 
fraction

      in January of 2021 showing to be at 58%.

2.   Chronic kidney disease, stage 3.

3.   Neoplasm involving the kidneys, not currently under treatment and 

      followed by Dr. Blake.

4.   Nicotine dependency in a smoker.

5.   Occlusion and stenosis of right coronary artery.

6.   Chronic cervical spinal stenosis.

7.   Advanced peripheral vascular disease.

8.   Previous cerebral infarction due to thrombus involving the precerebral 

      artery.

9.   Small-cell lung cancer currently followed by Dr. Blake and Dr. Bray.



PAST SURGICAL HISTORY:

1.   Bilateral breast implants.

2.   Partial Hysterectomy

3.   Bilateral carotid endarterectomy

4.   Bilateral cataract removal

5.   Cervical spine surgery





CURRENT MEDICATIONS:  Awaiting updated list in electronic medical records.



ALLERGIES:  CODEINE.



FAMILY HISTORY:   Noncontributory to current admission.



SOCIAL HISTORY:  The patient lives in [place].  She is  and lives by 
herself.  She is retired.  She has no history of alcohol usage but does smoke on
a daily basis, at least half pack a day.  Denies illicit drug use.



REVIEW OF SYSTEMS: 

CONSTITUTIONAL:  Positive for general malaise, fevers, chills, denies any 
unintentional weight loss.  

HEENT:  Positive for some nasal congestion, otherwise negative for headaches. 
vision changes, sore throat, earaches.

CHEST:   Positive for cough, nonproductive and some shortness of breath as noted
in operative procedure. 

HEART:  Denies chest pain, palpitations or syncopal episodes.  

ABDOMEN:   As noted in history of present illness, nausea but no associated 
acute abdominal pain.  No significant bowel habit changes.  No diarrhea or 
constipation.

GENITOURINARY:  Denies dysuria, hematuria or polyuria.

MUSCULOSKELETAL:  Denies arthralgias, joint swelling.

SKIN:  Denies unexplained lesions, rashes, moles.

NEUROLOGIC:  Denies ataxia, seizures, vision changes or headaches or syncopal 
other focal motor deficits. .

HEMATOLOGICAL:   Denies unexplained bleeding, easy bruising or transfusion 
reactions.



PHYSICAL EXAMINATION: 

VITAL SIGNS: Temperature on admission was 101.3, pulse 86, blood pressure 
157/74, respirations 22, oxygen saturation 92% on nasal cannula at rest on 2 
liters.



GENERAL:  The patient does look frail and unwell.  Otherwise, she is not showing
to be in any acute distress.  She is alert.



HEENT:  Tympanic membranes are clear bilaterally.  Oropharynx is pink, moist, 
without any lesions.



NECK:  Supple, non-tender, full range of motion, no jugular venous distention.



CHEST:  Lung sounds were fairly clear, just diminished towards the bases.  No 
notable rhonchi, rales, or wheezes. 



CARDIOVASCULAR:   Regular rate and rhythm without appreciable murmurs, rubs, or 
gallops.



ABDOMEN:  Soft, non-tender, positive bowel sounds.



EXTREMITIES:  No cyanosis, clubbing, or edema.



NEUROLOGIC: Cranial nerves II through XII are grossly intact.  She is alert and 
oriented x3.  Facial features were symmetrical.  Extraocular movements within 
normal limits.  There was no notable nystagmus.



SKIN:  Warm, pink and dry.



LABORATORY:  Studies done at [facility] prior to admission showed white count of 5,700 
with hemoglobin of 11.5, hematocrit 34.4.  Platelet count 325,000, differential 
apparently does not show a left shift.  Chemistries show normal electrolytes, 
creatinine elevated at 2.48 with BUN of 59.  Bilirubin and all other liver 
functions were within normal limits.  Troponin 0.03.  C-reactive protein 5.1.  
D-dimer was up 2230.  Fibrinogen 511.  Covid testing was done on 2/11/21 at [facility] 
and was positive.   Amylase and lipase pending.



RADIOLOGY:   Chest x-ray done today on admission, single-view, per radiology 
interpretation showed hazy bilateral pulmonary opacities, more pronounced in the
lung bases.  



ASSESSMENT: 

1.   Covid pneumonitis.

2.   Nausea and vomiting associated with monoclonal infusion exacerbated

      by #1 with lipase and amylase pending.

3.   Acute on chronic renal failure with stage 3.

4.   Hypertension with diastolic dysfunction, grade 1, with last ejection 
fraction

      in January of 2021 showing to be at 58%.

5.   Chronic kidney disease, stage 3.

6.   Neoplasm involving the kidneys, not currently under treatment and 

      followed by Dr. Blake.

7.   Nicotine dependency in a smoker.

8.   Occlusion and stenosis of right coronary artery.

9.   Chronic cervical spinal stenosis.

10.   Advanced peripheral vascular disease.

11.   Previous cerebral infarction due to thrombus involving the precerebral 

        artery.

12.   Small-cell lung cancer currently followed by Dr. Blake and Dr. Bray.



PLAN:   Ms. Ott is going to be placed in observation.  We will await her 
lipase and amylase and put her on clear fluids at this time.  She is not having 
any actual abdominal pain but is having some nausea but she is running a fever. 
I will go ahead and give her some IV Tylenol and put her on treatment for Covid 
with Decadron and empiric coverage, antibiotics with Rocephin and azithromycin. 
Given that she just had monoclonal infusion, we will hold off on the  
Remdesivir.  We will start her on Lovenox 1 mg/kg due to the elevated D-dimer 
and the fact that she does have an underlying history of cancer including lung 
and kidney.  She will be on Protonix, Align and Mucinex.  We will have her on 
oxygen as needed to keep her oxygen saturation above 92 to 94%.  She will have 
Zosyn and Phenergan as needed for any kind of nausea.   We will hold off on any 
fluids at this point.  If she is not able to tolerate clear liquids and take 
adequate p.o. fluids, certainly we will look at trying to do some maintenance 
fluids but given the fact that she is Covid positive, we will be very judicious 
with giving her any kind of fluid.  I would anticipate her length of stay to be 
at least 1 to 2 days.  Hopefully, we will be able to discharge her tomorrow or 
at least by Sunday.  Until then, we will continue to monitor and treat as 
needed.



#[id number]

MTDD

## 2021-02-13 VITALS — DIASTOLIC BLOOD PRESSURE: 69 MMHG | SYSTOLIC BLOOD PRESSURE: 148 MMHG | TEMPERATURE: 97.5 F

## 2021-02-13 VITALS — OXYGEN SATURATION: 96 %

## 2021-02-13 RX ADMIN — GUAIFENESIN SCH MG: 600 TABLET, EXTENDED RELEASE ORAL at 08:41

## 2021-02-13 RX ADMIN — ALBUTEROL SULFATE SCH PUFF: 90 AEROSOL, METERED RESPIRATORY (INHALATION) at 08:40

## 2021-02-13 RX ADMIN — Medication SCH ML: at 08:41

## 2021-02-13 RX ADMIN — ENOXAPARIN SODIUM SCH MG: 80 INJECTION, SOLUTION INTRAVENOUS; SUBCUTANEOUS at 08:39

## 2021-02-13 RX ADMIN — ALBUTEROL SULFATE SCH PUFF: 90 AEROSOL, METERED RESPIRATORY (INHALATION) at 12:55

## 2021-02-13 NOTE — RAD
EXAM: X-RAY, Chest (1 View)



HISTORY: COVID PNA.



COMPARISON: Chest x-ray from 2/12/2021.



TECHNIQUE: AP view of the chest.



FINDINGS:

Lungs: Partially calcified bilateral breast implants are present

partially limiting evaluation of the lung bases. There may be

mild stable pulmonary opacities in the lung bases posterior to

the breast implants.

Pleural space: No pneumothorax or pleural effusion is present.

Heart: The heart is normal in size.

Bones: No acute bone abnormality.





IMPRESSION:

Stable exam.



Electronically signed by:  Ry Hendricks MD  2/13/2021 8:40 AM

[facility] Workstation: [id number]

## 2021-02-24 NOTE — DS
SUPERVISING PHYSICIAN:  Justin Rivers MD



ADMISSION DIAGNOSIS:

1.   COVID pneumonitis.

2.   Nausea and vomiting associated with monoclonal infusion exacerbated

      by #1 with lipase and amylase pending.

3.   Acute on chronic renal failure with stage 3.

4.   Hypertension with diastolic dysfunction, grade 1, with last ejection 
fraction

      in January of 2021 showing to be at 58%.

5.   Chronic kidney disease, stage 3.

6.   Neoplasm involving the kidneys, not currently under treatment and 

      followed by Dr. Blake.

7.   Nicotine dependency in a smoker.

8.   Occlusion and stenosis of right coronary artery.

9.   Chronic cervical spinal stenosis.

10. Advanced peripheral vascular disease.

11. Previous cerebral infarction due to thrombus involving the precerebral 

      artery.

12. Small-cell lung cancer currently followed by Dr. Blake and Dr. Bray.



DISCHARGE DIAGNOSIS: 

1.   COVID pneumonitis.

2.   Nausea and vomiting associated with monoclonal infusion, resolved with 
fluids.

3.   Acute on renal failure, Stage 3, stable.

4.   Hypertension with diastolic dysfunction, grade 1, with last ejection 
fraction

      in January of 2021 showing to be at 58%.

5.   Chronic kidney disease, stage 3.

6.   Neoplasm involving the kidneys, not currently under treatment and 

      followed by Dr. Blake.

7.   Nicotine dependency in a smoker.

8.   Occlusion and stenosis of right coronary artery.

9.   Chronic cervical spinal stenosis.

10. Advanced peripheral vascular disease.

11. Previous cerebral infarction due to thrombus involving the precerebral 

      artery.

12. Small-cell lung cancer currently followed by Dr. Blake and Dr. Bray.



REASON FOR HOSPITALIZATION:  Ms. Ott is a 75 year-old  female 
patient who has a history of lung and kidney cancer.  She was diagnosed with 
COVID on 2/11/21 and was given the monoclonal infusion this morning.  She went 
home after the infusion, started having some nausea, at that point she called 
Dr. Blake who recommended she come to the Emergency Room for direct evaluation.
 She was evaluated in the Emergency Room.  Her labs were reviewed from the 
clinic which showed she had a creatinine of 2.48 was explained to BUN of 59.  
Differential did not show a left shift with a white count of 5,700.  Her D-dimer
was elevated at 2230 and C-reactive protein was elevated at 5.1.  On admission, 
she was showing a temperature of 101.3, oxygen saturation 92% on 2 liter nasal 
cannula.  Single view chest x-ray on admission showed some hazy bilateral 
pulmonary opacities, more prominent in the lung bases. Given the patient's 
advanced of 75 along with 2 separate types of cancer including lung and kidney 
and recent COVID diagnosis, the patient is going to be placed in observation at 
least overnight for further evaluation and close monitoring.  She is placed in 
observation in stable condition.



LABORATORY:  White count 4,600, hemoglobin 10.6, hematocrit 31.2, platelet count
278,000.  Differential was without a left shift.  Normal lymphocytic count.  
Coagulation studies did show D-dimer 1370.  Chemistries showed sodium 132, 
potassium 4.8, creatinine at baseline of 2.36.  Magnesium 1.6.  She did get 
magnesium supplementation prior to discharge.  C-reactive protein 5.8.  Amylase 
and lipase were initially elevated.  At discharge, lipase was down to 41, 
amylase at 103.



MICROBIOLOGY:  No specimens were submitted.



RADIOLOGY:  Chest x-ray on date of discharge per radiologic interpretation of 
single view chest showed partially calcified bilateral breast implants present. 
There were some mild stable pulmonary opacities in the lung bases posterior to 
the breast implants.  Otherwise, stable from previous exam on 02/12/21.  Please 
see that report for details.



HOSPITAL COURSE: Ms. Ott was admitted on 02/12/21.  She was started on 
clear liquid treatment with Decadron and antibiotic coverage with Rocephin and 
azithromycin.  Due to the fact that she had gotten monoclonal antibodies prior 
to admission, she did not receive remdesivir.  She was also started on 1 mg per 
kg of Lovenox due to elevated D-dimer with underlying history of cancer.  She 
did progress well with no nausea or vomiting.  She was tolerating a diet.  Vital
signs were stable.  Saturation was 96% on 2 liters nasal cannula.  Blood 
pressure 140/69, afebrile at 97.5.  It was felt she had improved well enough to 
continue with outpatient management.



PLAN:  Ms. Ott was discharged on 02/13/21 with instructions to followup 
with Dr. Blake in 7 days.  She was to call his office to establish appointment 
time.  She was provided with oxygen to wear as needed with nasal cannula to 
maintain her O2 saturations greater than 94%.  She was to take her medications 
as instructed.  She was to resume her usual diet and increase her activity as 
tolerated.  She was given warnings to return to the Emergency Room should she 
have any concerning symptoms.  Medications prescribed on discharge included:

1.  Align 4 mg daily while on antibiotics.

2.  Decadron 6 mg daily, #8, no refills.

3.  Eliquis 2.5 mg twice daily, #14, no refills, to be continued by Dr. Blake.

4.  Guaifenesin 600 mg twice daily while on antibiotics.

5.  Cefdinir once daily, #8, no refills.

6.  Albuterol inhaler q.4h. as needed for shortness of breath, #1 inhaler, no 
refills.

7.  Azithromycin 250 mg daily for 3 days, no refills.

8.  Zofran 4 mg q.6h. as needed, #10, no refills.



DISPOSITION:  The patient is discharged home.



CONDITION ON DISCHARGE:  Stable and improved.  



#[id number]

[facility]

## 2021-03-02 ENCOUNTER — HOSPITAL ENCOUNTER (OUTPATIENT)
Dept: HOSPITAL 39 - BFHH | Age: 76
End: 2021-03-02
Attending: FAMILY MEDICINE
Payer: MEDICARE

## 2021-03-02 DIAGNOSIS — U07.1: Primary | ICD-10-CM

## 2021-03-02 DIAGNOSIS — C34.92: ICD-10-CM

## 2024-12-13 NOTE — US
EXAM DESCRIPTION: 



Abdomen,Complete



CLINICAL HISTORY: 



RUQ PAIN



COMPARISON: 



Chest CT dated 15 January 2016



TECHNIQUE: 



Complete abdominal ultrasound



FINDINGS: 



Diffuse attenuation of sound is observed throughout the liver

consistent with hepatic steatosis. Cysts are identified in the

right and left lobes of liver is observed on a prior chest CT.. 

A polyp is identified in the gallbladder. No gallstones are seen.

The common bile duct is normal in caliber measuring 5.1 mm. 

Portions of the pancreas seen appear normal. 

The spleen is normal in appearance. 

The kidneys are normal in size, shape, and echotexture. 

The IVC and the proximal aorta are unremarkable. 



IMPRESSION: 



1. Hepatic steatosis is observed. Cysts are detected in both

lobes of liver.

2. Polyp is identified in the gallbladder. No stones are seen.



Electronically signed by:  Son Alvarado MD  12/22/2017 12:10 PM

[facility] Workstation: [id number]